# Patient Record
Sex: MALE | Race: WHITE
[De-identification: names, ages, dates, MRNs, and addresses within clinical notes are randomized per-mention and may not be internally consistent; named-entity substitution may affect disease eponyms.]

---

## 2018-01-18 NOTE — CONS
CONSULTATION



DATE OF SERVICE:

01/18/2018



A 47-year-old gentleman who has been evaluated in the Sleep Center for possible

obstructive sleep apnea-hypopnea syndrome.



HISTORY OF PRESENT ILLNESS/SLEEP WAKE EVALUATION:

Patient usual sleep schedule on working days from around 11:15 pm until 5:30 - 5:45

a.m. and on the weekends from around 11:30 p.m. to 7:15 a.m.  Sometimes he has problem

with falling asleep, although no TV in bedroom.  He wakes up from sleep multiple times

with snoring, heartburn, sweating and occasionally nocturia.  In the morning, he wakes

up tired.  [place] Sleepiness Scale is 4.



PAST MEDICAL HISTORY:

Positive for episodes of cardiac arrhythmia with several collapses started in the

morning after awakenings from sleep, hypertension, acid reflux.



PAST SURGICAL HISTORY:

Permanent pacemaker insertion.



SOCIAL HISTORY:

Negative for smoking. Alcohol consumption, 1 beer per week.



HOME MEDICATIONS:

Olmesartan, hydrochlorothiazide, lansoprazole.



REVIEW OF SYSTEMS:

Awakenings from sleep, sometimes tiredness in the morning.



FAMILY HISTORY:

Hypertension, heart problems, hyperlipidemia, stroke, arthritis, asthma, bronchitis,

lung problems, emphysema, snoring, cancer, thyroid problems.



PHYSICAL EXAM:

 gentleman without distress. /67, HR 82, RR 16, height 6, 0, weight 295,

BMI 40.  Neck 22 inches in circumference, temperature 97.5.  OROPHARYNX:  Extremely low

position of soft palate.  Mallampati 4.  Wide neck.

ABDOMEN:  Obese.

EXTREMITIES:  1+ ankle edema.

Neck  Supple, no JVD.  Thyroid is not palpable.

LUNGS  Clear to percussion and to auscultation.  Good air exchange.  No wheezing or

rhonchi.

HEART  S1, S2 regular.  No murmurs, gallops, or rubs.

CNS  Awake, alert, and oriented X3.  Cranial nerves 2 to 7 intact.  There is no

fasciculation or atrophy. noted.  No focal deficits observed.



IMPRESSION:

1. Snoring, multiple awakenings from sleep, extremely low position of soft palate,

    wide neck 22 inches, obstructive sleep apnea-hypopnea syndrome.

2. Obesity, body mass index of 40.

3. History of cardiac arrhythmia episodes with collapses.

4. Status post permanent pacemaker.

5. Hypertension.

6. Acid reflux.



PLAN:

1. Polysomnography for evaluation of patient's breathing during sleep.

2. CPAP/BiPAP titration if sleep study confirms obstructive sleep apnea-hypopnea

    syndrome.

3. Preferable position during sleep on the side.

4. No driving if patient feels any sleepiness.  Patient is aware of  civil and

    criminal liability for unsafe driving.

5. I will see patient for follow up visit to explain results of testing and following

    plan.

Thank you very much for referring this patient for consultation.



Sincerely,





Christopher Rubin MD, PhD, FAASM

Diplomat of American Board of Medical Specialties

American Board of Internal Medicine

Medical Director of [facility] [place]





MMODL / IJN: [id number] / Job#: [id number]

## 2018-05-31 NOTE — SFUN
SLEEP CENTER FOLLOW UP NOTE



DATE OF SERVICE:

05/31/2018



A 47-year-old gentleman who has been followed in the Sleep Center for treatment of

obstructive sleep apnea-hypopnea syndrome, recently patient had diagnostic sleep study

and CPAP titration and I discussed results of sleep studies with patient in detail.  He

was subsequently started on treatment with CPAP.  Today is his first visit after he was

started on treatment.  He is able to use machine every night for the whole night

without significant problems. The first several nights, he did have difficulties, but

then he was able to use it every night again without difficulties.  According to his

wife, he still has occasional snoring while he is using his CPAP equipment.



I checked his CPAP unit, pressure is 8 cm of water. Leak is only 1 L/minute which is

nothing.  Apnea-hypopnea index reading for the whole period of time of usage of the

machine is 3.5.  Usage is 59/65 nights for more than 4 hours.



MEDICATIONS:

Benicar, Prevacid, hydrochlorothiazide.



PHYSICAL EXAM:

Patient in no distress. /75, HR 95, RR 16, weight 293, temp 98.2, O2 saturation

at room air 94%,.

OROPHARYNX  Low position of soft palate.

ABDOMEN:  Obese.

Neck  Supple, no JVD.  Thyroid is not palpable.

LUNGS  Clear to percussion and to auscultation.  Good air exchange.  No wheezing or

rhonchi.

HEART  S1, S2 regular.  No murmurs, gallops, or rubs.

EXTREMITIES  No clubbing or cyanosis.

CNS  Awake, alert, and oriented X3.  Cranial nerves 2 to 7 intact.  There is no

fasciculation or atrophy. noted.  No focal deficits observed.



IMPRESSION:

1. Obstructive sleep apnea-hypopnea syndrome on control with CPAP.  The patient

    demonstrated great compliance with treatment, benefitting from treatment.

2. Obesity.

3. History of episodes of cardiac arrhythmia.

4. Status post permanent pacemaker insertion.

5. Hypertension.

6. Acid reflux.



PLAN:

1. Patient will continue to use his CPAP equipment every night for the whole night.

2. I will increase pressure in his CPAP unit to 9 cm of water with a goal to stop

    snoring.

3. Losing weight.

4. Sleep hygiene with regular time in bed for at least 8 hours.

5. No driving if feeling any sleepiness.

6. Followup visit in February of 2019 or earlier if patient has any problems.

Sincerely,





Christopher Rubin MD, PhD, FAASM

Diplomat of American Board of Medical Specialties

American Board of Internal Medicine

Medical Director of [facility]





MMCATRINAL / DIANNE: [id number] / Job#: [id number]

## 2021-01-07 ENCOUNTER — HOSPITAL ENCOUNTER (OUTPATIENT)
Dept: HOSPITAL 47 - SLEEP | Age: 51
Discharge: HOME | End: 2021-01-07
Attending: INTERNAL MEDICINE
Payer: COMMERCIAL

## 2021-01-07 DIAGNOSIS — G47.33: Primary | ICD-10-CM

## 2021-01-07 DIAGNOSIS — I10: ICD-10-CM

## 2021-01-07 DIAGNOSIS — Z86.79: ICD-10-CM

## 2021-01-07 DIAGNOSIS — Z79.84: ICD-10-CM

## 2021-01-07 DIAGNOSIS — Z79.82: ICD-10-CM

## 2021-01-07 DIAGNOSIS — Z99.89: ICD-10-CM

## 2021-01-07 DIAGNOSIS — K21.9: ICD-10-CM

## 2021-01-07 DIAGNOSIS — Z95.0: ICD-10-CM

## 2021-01-07 DIAGNOSIS — E66.9: ICD-10-CM

## 2021-01-07 NOTE — SFUN
SLEEP CENTER FOLLOW UP NOTE



DATE OF SERVICE:

01/07/2021



50-year-old gentleman has been followed in Sleep Center for treatment of obstructive

sleep apnea-hypopnea syndrome.  I did not see patient for 2-1/2 years.  The patient

continues to use his CPAP equipment every night. He does not snore.  [place] Sleepiness

Scale is 5.



I checked CPAP unit.  CPAP pressure is 9 cm of water. Usage is 30 out of 30 nights for

more than 4 hours with average usage is 7.3 hours per night. Leak is only 1 L/minute.

Apnea-hypopnea index was 2.0, which is absolutely perfect.



MEDICATIONS:

Present medications Olmesartan hydrochlorothiazide 20-12.5 mg once a day, Atorvastatin

20 mg once a day.  Metformin 1000 mg once a day, aspirin 81 mg once a day, _____ 3mg -5

mg once a day.



PHYSICAL EXAM:

Patient in no distress. /65, HR 89, RR 15, height 6 inches 0 inches, weight

275.2, BMI 37.2, temperature 98.6, oxygen saturation at room air 95%.  Oropharynx: Low

position of soft palate.

NECK:  Supple, no JVD.  Thyroid is not palpable.

LUNGS:  Clear to percussion and to auscultation.  Good air exchange.  No wheezing or

rhonchi.

HEART:  S1, S2 regular.  No murmurs, gallops, or rubs.

ABDOMEN:  Obese. Soft and nontender.  Bowel sounds are present.  No organomegaly

appreciated.

EXTREMITIES: No clubbing or cyanosis.

CNS:  Awake, alert, and oriented X3.  Cranial nerves 2 to 7 intact.  There is no

fasciculation or atrophy. noted.  No focal deficits observed.



IMPRESSION:

1. Obstructive sleep apnea-hypopnea syndrome.  The patient demonstrated 100%

    compliance with treatment benefitting from treatment.

2. Obesity.

3. History of episodes of cardiac arrhythmia.

4. Status post permanent pacemaker insertion.

5. Hypertension.

6. Acid reflux.



PLAN:

1. Prescription for all necessary CPAP supplies, including full-face mask, air touch

    F20 medium-size.



1. Patient will continue to use PAP equipment every night for the whole night.

2. Sleep hygiene with regular time in bed for at least 7-1/2 to 8 hours.

3. Precautions related to driving. No driving if feeling sleepiness.

4. I will maintain all necessary prescription for PAP supplies including mask, tube,

    filters.

5. Watching weight.

6. No driving if feeling sleepiness.

7. Follow-up visit in 6 months or earlier if patient has any problems.



Thank you very much for allowing us treatment the patient.



Sincerely,







Christopher Rubin MD, PhD, FAASM

Diplomat of American Board of Medical Specialties

American Board of Internal Medicine

Medical Director of [facility] [place]





MMODL / IJN: [id number] / Job#: [id number]

## 2021-07-21 ENCOUNTER — HOSPITAL ENCOUNTER (OUTPATIENT)
Dept: HOSPITAL 47 - LABWHC1 | Age: 51
Discharge: HOME | End: 2021-07-21
Attending: INTERNAL MEDICINE
Payer: COMMERCIAL

## 2021-07-21 DIAGNOSIS — I49.5: ICD-10-CM

## 2021-07-21 DIAGNOSIS — I47.2: ICD-10-CM

## 2021-07-21 DIAGNOSIS — E78.5: Primary | ICD-10-CM

## 2021-07-21 PROCEDURE — 80061 LIPID PANEL: CPT

## 2021-07-21 PROCEDURE — 36415 COLL VENOUS BLD VENIPUNCTURE: CPT

## 2021-07-21 PROCEDURE — 80053 COMPREHEN METABOLIC PANEL: CPT

## 2021-07-21 PROCEDURE — 84443 ASSAY THYROID STIM HORMONE: CPT

## 2021-07-22 LAB
ALBUMIN SERPL-MCNC: 4.8 G/DL (ref 3.8–4.9)
ALBUMIN/GLOB SERPL: 2.09 G/DL (ref 1.6–3.17)
ALP SERPL-CCNC: 64 U/L (ref 41–126)
ALT SERPL-CCNC: 28 U/L (ref 10–49)
ANION GAP SERPL CALC-SCNC: 10.1 MMOL/L (ref 4–12)
AST SERPL-CCNC: 26 U/L (ref 14–35)
BUN SERPL-SCNC: 18 MG/DL (ref 9–27)
BUN/CREAT SERPL: 25.71 RATIO (ref 12–20)
CALCIUM SPEC-MCNC: 9.6 MG/DL (ref 8.7–10.3)
CHLORIDE SERPL-SCNC: 102 MMOL/L (ref 96–109)
CHOLEST SERPL-MCNC: 132 MG/DL (ref 0–200)
CO2 SERPL-SCNC: 26.9 MMOL/L (ref 21.6–31.8)
GLOBULIN SER CALC-MCNC: 2.3 G/DL (ref 1.6–3.3)
GLUCOSE SERPL-MCNC: 103 MG/DL (ref 70–110)
HDLC SERPL-MCNC: 35 MG/DL (ref 40–60)
LDLC SERPL CALC-MCNC: 67.2 MG/DL (ref 0–131)
POTASSIUM SERPL-SCNC: 4.6 MMOL/L (ref 3.5–5.5)
PROT SERPL-MCNC: 7.1 G/DL (ref 6.2–8.2)
SODIUM SERPL-SCNC: 139 MMOL/L (ref 135–145)
TRIGL SERPL-MCNC: 149 MG/DL (ref 0–149)
VLDLC SERPL CALC-MCNC: 29.8 MG/DL (ref 5–40)

## 2021-08-26 ENCOUNTER — HOSPITAL ENCOUNTER (OUTPATIENT)
Dept: HOSPITAL 47 - SLEEP | Age: 51
End: 2021-08-26
Attending: INTERNAL MEDICINE
Payer: COMMERCIAL

## 2021-08-26 DIAGNOSIS — Z79.899: ICD-10-CM

## 2021-08-26 DIAGNOSIS — Z95.0: ICD-10-CM

## 2021-08-26 DIAGNOSIS — G47.33: Primary | ICD-10-CM

## 2021-08-26 DIAGNOSIS — K21.9: ICD-10-CM

## 2021-08-26 DIAGNOSIS — I10: ICD-10-CM

## 2021-08-26 DIAGNOSIS — Z86.79: ICD-10-CM

## 2021-08-26 DIAGNOSIS — E66.9: ICD-10-CM

## 2021-08-26 NOTE — SFUN
SLEEP CENTER FOLLOW UP NOTE



DATE OF SERVICE:

08/26/2021



This 50-year-old gentleman has been followed in Sleep Center for treatment of

obstructive sleep apnea-hypopnea syndrome.



Patient continues to use CPAP equipment every night.  No snoring with the machine.  No

problems with the supplies.  [place] Sleepiness Scale today is 6, which is normal.



I checked his CPAP unit.  Pressure is 9 cm of water. Usage is 100% of the nights for

more than 4 hours with average usage 7.2 hours per night. Leak is only 2 L/minute,

which is perfect.  Apnea-hypopnea index is 1.7, which is absolutely perfect.



MEDICATIONS:

Olmesartan/hydrochlorothiazide 20/12.5 mg once a day, atorvastatin 20 mg once a day,

metoprolol 25 mg once a day, metformin 1000 mg once a day.



PHYSICAL EXAMINATION:

GENERAL:  A pleasant patient without  any distress.

VITAL SIGNS: /70, HR 80, RR 15, height 6 feet 0 inches, weight 263, body mass

index 35.6, temperature 99%, oxygen saturation at room air 95%.

HEENT: PERRLA, EOMI, evaluation of oropharynx showed tongue protrudes midline. Low

position of soft palate.

NECK:  Supple, no JVD.  Thyroid is not palpable.

LUNGS:  Clear to percussion and to auscultation.  Good air exchange.  No wheezing or

rhonchi.

HEART:  S1, S2 regular.  No murmurs, gallops, or rubs.

ABDOMEN:  Slightly obese.

EXTREMITIES: No clubbing or cyanosis.

CNS:  Awake, alert, and oriented X3.  Cranial nerves 2 to 7 intact.  There is no

fasciculation or atrophy. noted.  No focal deficits observed.



IMPRESSION:

1. Obstructive sleep apnea-hypopnea syndrome. Patient demonstrated 100% compliance

    with treatment, benefitting from treatment.

2. Obesity.

3. History of episodes of cardiac arrhythmia in the past. No recent episodes.

4. Status post permanent pacemaker insertion.

5. Hypertension.

6. Acid reflux.



PLAN:

1. Patient will continue to use PAP equipment every night for the whole night.

2. Sleep hygiene with regular time in bed for at least 7-1/2 to 8 hours.

3. Precautions related to driving. No driving if feeling sleepiness.

4. I will maintain all necessary prescription for PAP supplies including mask, tube,

    filters.

5. Watching weight.

6. Follow-up visit in 6 months or earlier if patient has any problems.

7. The patient is planning to move to [place]. We will submit all necessary

    documentation to the doctor in [place].



Thank you very much for allowing me to participate in the management of your patient.



Sincerely,







Christopher Rubin MD, PhD, FAASM

Diplomat of American Board of Medical Specialties

Sleep Medicine Board of American Board of Internal Medicine

Medical Director of [facility] [place]





MMCATRINAL / CARLITON: [id number] / Job#: [id number]